# Patient Record
Sex: FEMALE | Race: WHITE | NOT HISPANIC OR LATINO | Employment: FULL TIME | URBAN - METROPOLITAN AREA
[De-identification: names, ages, dates, MRNs, and addresses within clinical notes are randomized per-mention and may not be internally consistent; named-entity substitution may affect disease eponyms.]

---

## 2022-01-31 ENCOUNTER — HISTORICAL (OUTPATIENT)
Dept: ADMINISTRATIVE | Facility: HOSPITAL | Age: 47
End: 2022-01-31

## 2022-01-31 LAB
ABS NEUT (OLG): 3.02 (ref 2.1–9.2)
B-HCG SERPL QL: NEGATIVE
BASOPHILS # BLD AUTO: 0.1 10*3/UL (ref 0–0.2)
BASOPHILS NFR BLD AUTO: 1 %
EOSINOPHIL # BLD AUTO: 0.2 10*3/UL (ref 0–0.9)
EOSINOPHIL NFR BLD AUTO: 5 %
ERYTHROCYTE [DISTWIDTH] IN BLOOD BY AUTOMATED COUNT: 16.7 % (ref 11.5–17)
HCG UR QL IA.RAPID: NEGATIVE
HCG UR QL IA.RAPID: POSITIVE
HCT VFR BLD AUTO: 33.9 % (ref 37–47)
HGB BLD-MCNC: 10.7 G/DL (ref 12–16)
LYMPHOCYTES # BLD AUTO: 1.5 10*3/UL (ref 0.6–4.6)
LYMPHOCYTES NFR BLD AUTO: 28 %
MANUAL DIFF? (OHS): NO
MCH RBC QN AUTO: 26.6 PG (ref 27–31)
MCHC RBC AUTO-ENTMCNC: 31.6 G/DL (ref 33–36)
MCV RBC AUTO: 84.3 FL (ref 80–94)
MONOCYTES # BLD AUTO: 0.4 10*3/UL (ref 0.1–1.3)
MONOCYTES NFR BLD AUTO: 8 %
NEUTROPHILS # BLD AUTO: 3.02 10*3/UL (ref 2.1–9.2)
NEUTROPHILS NFR BLD AUTO: 57 %
PLATELET # BLD AUTO: 288 10*3/UL (ref 130–400)
PMV BLD AUTO: 12 FL (ref 9.4–12.4)
RBC # BLD AUTO: 4.02 10*6/UL (ref 4.2–5.4)
WBC # SPEC AUTO: 5.3 10*3/UL (ref 4.5–11.5)

## 2022-02-02 ENCOUNTER — HOSPITAL ENCOUNTER (OUTPATIENT)
Dept: OBSTETRICS AND GYNECOLOGY | Facility: HOSPITAL | Age: 47
End: 2022-02-03
Attending: OBSTETRICS & GYNECOLOGY | Admitting: OBSTETRICS & GYNECOLOGY

## 2022-05-14 NOTE — OP NOTE
DATE OF SURGERY:    02/02/2022    SURGEON:  Yossi Wisdom MD  ASSISTANT:  Quentin Wisdom MD    PROCEDURE PERFORMED:  Total laparoscopic hysterectomy, left salpingo-oophorectomy.    PREOPERATIVE DIAGNOSIS:  The patient is a 46-year-old white female suffering with pelvic pain and menorrhagia and also adenomyosis.    POSTOPERATIVE DIAGNOSES:    1. The patient is a 46-year-old white female suffering with pelvic pain and menorrhagia and also adenomyosis.  2. Dense pelvic adhesions.  3. Left ovarian cyst.    PROCEDURE IN DETAIL:  After proper consents were obtained, the patient was brought to the operating room, placed in supine position, underwent general anesthesia and intubation without difficulty.  She was then placed in dorsal lithotomy position, prepped and draped in a sterile fashion.  The GENEVIEVE manipulator was placed into the cervical canal.  A small incision was then made above the umbilicus and the Veress needle was placed into the abdominal cavity.  Two liters of CO2 were allowed to enter the abdominal cavity.  The trocars were then introduced without difficulty.  The da Holli robotic system was then put into place and docked.  Contents of the pelvic cavity were examined.  The uterus was noted to be markedly enlarged secondary to adenomyosis.  The left ovary had a simple cyst on it.  The right ovary was within normal limits.  At this point in time, the round ligaments were identified bilaterally, clamped, cauterized and cut.  The infundibulopelvic ligament on the left was clamped, cauterized and cut.  The right ovarian pedicle was then clamped, cauterized and cut.  The bladder, which was densely adhered to the lower uterine segment, was meticulously dissected down below the level of the cervix thus exposing uterine arteries.  The uterine arteries were then clamped, cauterized and cut bilaterally.  An anterior and posterior colpotomy incision was then made and the ring was demonstrated.  The  tissue at the 3 o'clock and 9 o'clock positions were then dissected down to the ring, thus completing the colpotomy.  The specimen was removed transvaginally.  Due to the large nature of the uterus, it took us almost 45 minutes to get the uterus from the pelvic cavity into the vaginal cavity.  Once this was accomplished, the vaginal cuff was then closed with 2 sutures of Stratafix.  All pedicles were checked and found to be absolutely dry.  Attention was then placed in the vagina.  We inspected the vagina to make sure there were no tears or injuries and there was none noted.  There was no evidence of any visceral injuries.  At this point in time, the CO2 was allowed to escape from the abdominal cavity, the trocars removed without difficulty.  The     small incisions were closed with 3-0 Vicryl subcuticular stitch.  The patient was carefully brought out of dorsal lithotomy position, brought to the recovery room in stable fashion, tolerating the procedure well.        ______________________________  Yossi Wisdom MD CEP/BEBETO  DD:  02/02/2022  Time:  11:19AM  DT:  02/02/2022  Time:  11:30AM  Job #:  [id number]

## 2022-05-14 NOTE — DISCHARGE SUMMARY
DISCHARGE DATE:  02/03/2022    CHIEF COMPLAINT/PERTINENT HISTORY:  Patient is a 46-year-old white female who was admitted for a TLH.  For pertinent physical findings on admit, please refer to my initial history and physical done on admit.    HOSPITAL COURSE AND TREATMENT:  Patient underwent a TLH/LSO, tolerating the procedure well.  Her postoperative course has been essentially unremarkable.    FINAL DIAGNOSIS:  Adenomyosis.    CONDITION ON DISCHARGE:  Subjectively, the patient without complaints, tolerating a regular diet, ambulating without assistance.  Objectively, vital signs are stable.  Physical exam essentially normal.    DISPOSITION:  Activity is pelvic rest.  Diet is regular.  Medications are Percocet.    FOLLOWUP:  In my office in 2 weeks.        ______________________________  Yossi Wisdom MD    CEP/UD  DD:  02/03/2022  Time:  01:12PM  DT:  02/03/2022  Time:  01:35PM  Job #:  [id number]

## 2024-01-30 PROBLEM — Z12.11 SCREENING FOR COLON CANCER: Status: ACTIVE | Noted: 2024-01-30

## 2025-06-09 ENCOUNTER — LAB VISIT (OUTPATIENT)
Dept: LAB | Facility: HOSPITAL | Age: 50
End: 2025-06-09
Attending: OBSTETRICS & GYNECOLOGY
Payer: COMMERCIAL

## 2025-06-09 DIAGNOSIS — N83.299 GERMINAL INCLUSION CYST OF OVARY: Primary | ICD-10-CM

## 2025-06-09 LAB — CANCER AG125 SERPL-ACNC: 13.7 UNIT/ML (ref 0–35)

## 2025-06-09 PROCEDURE — 36415 COLL VENOUS BLD VENIPUNCTURE: CPT

## 2025-06-09 PROCEDURE — 86304 IMMUNOASSAY TUMOR CA 125: CPT
